# Patient Record
Sex: MALE | Race: OTHER
[De-identification: names, ages, dates, MRNs, and addresses within clinical notes are randomized per-mention and may not be internally consistent; named-entity substitution may affect disease eponyms.]

---

## 2020-07-05 ENCOUNTER — HOSPITAL ENCOUNTER (INPATIENT)
Dept: HOSPITAL 54 - ER | Age: 55
LOS: 5 days | Discharge: NURSING, REHAB OR LONG-TERM CARE | DRG: 133 | End: 2020-07-10
Attending: NURSE PRACTITIONER | Admitting: INTERNAL MEDICINE
Payer: MEDICAID

## 2020-07-05 VITALS — DIASTOLIC BLOOD PRESSURE: 80 MMHG | SYSTOLIC BLOOD PRESSURE: 134 MMHG

## 2020-07-05 VITALS — BODY MASS INDEX: 23.78 KG/M2 | WEIGHT: 148 LBS | HEIGHT: 66 IN

## 2020-07-05 VITALS — DIASTOLIC BLOOD PRESSURE: 77 MMHG | SYSTOLIC BLOOD PRESSURE: 131 MMHG

## 2020-07-05 DIAGNOSIS — G93.40: ICD-10-CM

## 2020-07-05 DIAGNOSIS — Z93.1: ICD-10-CM

## 2020-07-05 DIAGNOSIS — Z93.0: ICD-10-CM

## 2020-07-05 DIAGNOSIS — B96.5: ICD-10-CM

## 2020-07-05 DIAGNOSIS — I10: ICD-10-CM

## 2020-07-05 DIAGNOSIS — N39.0: ICD-10-CM

## 2020-07-05 DIAGNOSIS — R32: ICD-10-CM

## 2020-07-05 DIAGNOSIS — R13.10: ICD-10-CM

## 2020-07-05 DIAGNOSIS — D64.9: ICD-10-CM

## 2020-07-05 DIAGNOSIS — J96.20: Primary | ICD-10-CM

## 2020-07-05 DIAGNOSIS — E88.09: ICD-10-CM

## 2020-07-05 DIAGNOSIS — Z86.73: ICD-10-CM

## 2020-07-05 DIAGNOSIS — E44.0: ICD-10-CM

## 2020-07-05 LAB
ALBUMIN SERPL BCP-MCNC: 2.9 G/DL (ref 3.4–5)
ALP SERPL-CCNC: 117 U/L (ref 46–116)
ALT SERPL W P-5'-P-CCNC: 46 U/L (ref 12–78)
AST SERPL W P-5'-P-CCNC: 19 U/L (ref 15–37)
BASOPHILS # BLD AUTO: 0 /CMM (ref 0–0.2)
BASOPHILS NFR BLD AUTO: 0.4 % (ref 0–2)
BILIRUB SERPL-MCNC: 0.2 MG/DL (ref 0.2–1)
BUN SERPL-MCNC: 11 MG/DL (ref 7–18)
CALCIUM SERPL-MCNC: 8.9 MG/DL (ref 8.5–10.1)
CHLORIDE SERPL-SCNC: 101 MMOL/L (ref 98–107)
CK SERPL-CCNC: 18 U/L (ref 39–308)
CO2 SERPL-SCNC: 31 MMOL/L (ref 21–32)
CREAT SERPL-MCNC: 0.6 MG/DL (ref 0.6–1.3)
CRP SERPL-MCNC: 1.6 MG/DL (ref 0–0.9)
D DIMER PPP FEU-MCNC: 1.65 MG/L(FEU (ref 0.17–0.5)
EOSINOPHIL NFR BLD AUTO: 7.6 % (ref 0–6)
FERRITIN SERPL-MCNC: 122 NG/ML (ref 8–388)
GLUCOSE SERPL-MCNC: 118 MG/DL (ref 74–106)
HCT VFR BLD AUTO: 37 % (ref 39–51)
HGB BLD-MCNC: 12.1 G/DL (ref 13.5–17.5)
LYMPHOCYTES NFR BLD AUTO: 2.3 /CMM (ref 0.8–4.8)
LYMPHOCYTES NFR BLD AUTO: 22 % (ref 20–44)
MCHC RBC AUTO-ENTMCNC: 33 G/DL (ref 31–36)
MCV RBC AUTO: 91 FL (ref 80–96)
MONOCYTES NFR BLD AUTO: 0.6 /CMM (ref 0.1–1.3)
MONOCYTES NFR BLD AUTO: 5.5 % (ref 2–12)
NEUTROPHILS # BLD AUTO: 6.6 /CMM (ref 1.8–8.9)
NEUTROPHILS NFR BLD AUTO: 64.5 % (ref 43–81)
NT-PROBNP SERPL-MCNC: 207 PG/ML (ref 0–125)
PH UR STRIP: 7.5 [PH] (ref 5–8)
PLATELET # BLD AUTO: 449 /CMM (ref 150–450)
POTASSIUM SERPL-SCNC: 4.3 MMOL/L (ref 3.5–5.1)
PROT SERPL-MCNC: 7.3 G/DL (ref 6.4–8.2)
RBC # BLD AUTO: 4.03 MIL/UL (ref 4.5–6)
RBC #/AREA URNS HPF: (no result) /HPF (ref 0–2)
SODIUM SERPL-SCNC: 138 MMOL/L (ref 136–145)
UROBILINOGEN UR STRIP-MCNC: 0.2 EU/DL
WBC #/AREA URNS HPF: (no result) /HPF
WBC NRBC COR # BLD AUTO: 10.3 K/UL (ref 4.3–11)

## 2020-07-05 PROCEDURE — G0378 HOSPITAL OBSERVATION PER HR: HCPCS

## 2020-07-05 RX ADMIN — Medication PRN ML: at 17:42

## 2020-07-05 RX ADMIN — ENOXAPARIN SODIUM SCH MG: 40 INJECTION SUBCUTANEOUS at 16:56

## 2020-07-05 RX ADMIN — DEXTROSE MONOHYDRATE SCH MG: 50 INJECTION, SOLUTION INTRAVENOUS at 16:54

## 2020-07-05 RX ADMIN — ACETAMINOPHEN PRN MG: 325 TABLET ORAL at 22:44

## 2020-07-05 NOTE — NUR
RN ADMITTING NOTE 



Patient arrived around 1515, received report from Ottoniel in ER. Patient arrived on gurney, 
A/O x0, non-verbal. T-piece Portex #7 in place on 5L/min saturating 98%. Patient suctioned 
and presents with thick white/yellow secretions. Vital signs -80, HR 85, SpO2 98% T 
97.4F. Tele monitor SR 80s. Skin assessed and skin is intact. G-tube in place, clamped. 
Patient given bed bath, linens and gown changed. Bed is in lowest position, side rails x3 in 
upright position, fall safety and aspiration precautions enforced. Isolation precautions due 
to pending COVID results. Will notify MD that patient has arrived and await admitting 
orders.

## 2020-07-05 NOTE — NUR
RN NOTE 



Spoke with sub-acute RN @ [facility], they will fax over Med-Recon since it was not 
found in patient's records.

## 2020-07-05 NOTE — NUR
RN CLOSING NOTE 



Patient is resting in bed, A/O x0, non-verbal, patient is in no acute distress at this time, 
t-piece in place Portex #7 on 3L/min saturating %. Patient suctioned with thick 
yellow/white secretions. G-tube in place running Jevity 1.2 @ 30mls/hr, will endorse to 
night shift to advance as tolerated. IV line in the LFA#18g is clean and intact running TKO. 
DVT pumps are in place. All patient needs met, all due medications given, patient kept clean 
and dry throughout shift, fall safety and aspiration precautions enforced. Will endorse to 
night shift for SHELLI. 

-------------------------------------------------------------------------------

Addendum: 07/05/20 at 1846 by ANOOP FUENTES RN

-------------------------------------------------------------------------------

ISOLATION PRECAUTIONS COVID RESULTS PENDING.

## 2020-07-05 NOTE — NUR
RN OPENING NOTE



PT RECEIVED IN BED OBTUNDED. PT IS ON 3 L VIA TRACH SATING 99%. NO RESPIRATORY DISTRESS 
NOTED. PT HAS TEMP 100. COOLING MEASURES PROVIDED. SAFETY MEASURES IN PLACE. BED AT LOWEST 
POSITION, LOCKED AND SIDE RAILS UP, HOB ELEVATED, WILL CONTINUE TO MONITOR.

## 2020-07-05 NOTE — NUR
RT NOTE



PT RECEIVED ON T-PIECE @ 3LPM. SX DONE, MODERATE THICK YELLOW SECRETIONS NOTED. NO DISTRESS 
NOTED AT THIS TIME. CONT. PULSE OX CONNECTED. WILL MONITOR.

-------------------------------------------------------------------------------

Addendum: 07/05/20 at 2001 by NAKIA NELSON RT

-------------------------------------------------------------------------------

Amended: Links added.

## 2020-07-05 NOTE — NUR
pt transported to unit on gurney with emt and rn at bedside w/ acls protocol. 
nad noted during transport.

## 2020-07-06 VITALS — SYSTOLIC BLOOD PRESSURE: 151 MMHG | DIASTOLIC BLOOD PRESSURE: 89 MMHG

## 2020-07-06 VITALS — DIASTOLIC BLOOD PRESSURE: 96 MMHG | SYSTOLIC BLOOD PRESSURE: 149 MMHG

## 2020-07-06 VITALS — SYSTOLIC BLOOD PRESSURE: 145 MMHG | DIASTOLIC BLOOD PRESSURE: 85 MMHG

## 2020-07-06 VITALS — DIASTOLIC BLOOD PRESSURE: 46 MMHG | SYSTOLIC BLOOD PRESSURE: 149 MMHG

## 2020-07-06 VITALS — DIASTOLIC BLOOD PRESSURE: 71 MMHG | SYSTOLIC BLOOD PRESSURE: 136 MMHG

## 2020-07-06 VITALS — DIASTOLIC BLOOD PRESSURE: 93 MMHG | SYSTOLIC BLOOD PRESSURE: 160 MMHG

## 2020-07-06 VITALS — DIASTOLIC BLOOD PRESSURE: 98 MMHG | SYSTOLIC BLOOD PRESSURE: 147 MMHG

## 2020-07-06 VITALS — SYSTOLIC BLOOD PRESSURE: 160 MMHG | DIASTOLIC BLOOD PRESSURE: 93 MMHG

## 2020-07-06 LAB
BASOPHILS # BLD AUTO: 0 /CMM (ref 0–0.2)
BASOPHILS NFR BLD AUTO: 0.1 % (ref 0–2)
BUN SERPL-MCNC: 17 MG/DL (ref 7–18)
CALCIUM SERPL-MCNC: 9.3 MG/DL (ref 8.5–10.1)
CHLORIDE SERPL-SCNC: 100 MMOL/L (ref 98–107)
CHOLEST SERPL-MCNC: 105 MG/DL (ref ?–200)
CO2 SERPL-SCNC: 27 MMOL/L (ref 21–32)
CREAT SERPL-MCNC: 0.6 MG/DL (ref 0.6–1.3)
EOSINOPHIL NFR BLD AUTO: 0 % (ref 0–6)
GLUCOSE SERPL-MCNC: 149 MG/DL (ref 74–106)
HCT VFR BLD AUTO: 37 % (ref 39–51)
HDLC SERPL-MCNC: 31 MG/DL (ref 40–60)
HGB BLD-MCNC: 11.9 G/DL (ref 13.5–17.5)
LDLC SERPL DIRECT ASSAY-MCNC: 69 MG/DL (ref 0–99)
LYMPHOCYTES NFR BLD AUTO: 1.3 /CMM (ref 0.8–4.8)
LYMPHOCYTES NFR BLD AUTO: 11.8 % (ref 20–44)
MAGNESIUM SERPL-MCNC: 2.3 MG/DL (ref 1.8–2.4)
MCHC RBC AUTO-ENTMCNC: 32 G/DL (ref 31–36)
MCV RBC AUTO: 92 FL (ref 80–96)
MONOCYTES NFR BLD AUTO: 0.2 /CMM (ref 0.1–1.3)
MONOCYTES NFR BLD AUTO: 1.8 % (ref 2–12)
NEUTROPHILS # BLD AUTO: 9.4 /CMM (ref 1.8–8.9)
NEUTROPHILS NFR BLD AUTO: 86.3 % (ref 43–81)
PHOSPHATE SERPL-MCNC: 4.2 MG/DL (ref 2.5–4.9)
PLATELET # BLD AUTO: 437 /CMM (ref 150–450)
POTASSIUM SERPL-SCNC: 4.4 MMOL/L (ref 3.5–5.1)
RBC # BLD AUTO: 4.03 MIL/UL (ref 4.5–6)
SODIUM SERPL-SCNC: 135 MMOL/L (ref 136–145)
TRIGL SERPL-MCNC: 92 MG/DL (ref 30–150)
TSH SERPL DL<=0.005 MIU/L-ACNC: 0.61 UIU/ML (ref 0.36–3.74)
WBC NRBC COR # BLD AUTO: 10.9 K/UL (ref 4.3–11)

## 2020-07-06 RX ADMIN — DEXTROSE MONOHYDRATE SCH MLS/HR: 50 INJECTION, SOLUTION INTRAVENOUS at 13:36

## 2020-07-06 RX ADMIN — PANTOPRAZOLE SODIUM SCH MG: 40 TABLET, DELAYED RELEASE ORAL at 07:31

## 2020-07-06 RX ADMIN — SCOPOLAMINE SCH EA: 1 PATCH, EXTENDED RELEASE TRANSDERMAL at 13:35

## 2020-07-06 RX ADMIN — DEXTROSE MONOHYDRATE SCH MG: 50 INJECTION, SOLUTION INTRAVENOUS at 16:28

## 2020-07-06 RX ADMIN — METOPROLOL TARTRATE SCH MG: 50 TABLET, FILM COATED ORAL at 20:54

## 2020-07-06 RX ADMIN — DEXTROSE MONOHYDRATE SCH MG: 50 INJECTION, SOLUTION INTRAVENOUS at 08:16

## 2020-07-06 RX ADMIN — DEXTROSE MONOHYDRATE SCH MLS/HR: 50 INJECTION, SOLUTION INTRAVENOUS at 12:15

## 2020-07-06 RX ADMIN — ENOXAPARIN SODIUM SCH MG: 40 INJECTION SUBCUTANEOUS at 20:53

## 2020-07-06 NOTE — NUR
TELE RN CLOSING NOTES



PATIENT IN BED, ASLEEP ATT THIS TIME. DURING THE DAY OBTUNDED AND NO-VERBAL. TRACH PRESENT 
(#7); BREATHING IS EVEN AND UNLABORED. CARDIAC MONITOR WITH A CURRENT READING OF SR 81 BPM. 
NO S/S OF PAIN THROUGHOUT THE DAY SUCH AS MOANING, GUARDING OR FACIAL GRIMACING. LEFT 
FOREARM IV ACCESS G # 18 PRESENT AND INTACT. G-TUBE PRESENT WITH JEVITY 1.2 RUNNING AT 30 
MLS/HR.ALL NEEDS ATTENDED T DURING THE SHIFT. SAFETY PRECAUTIONS IN PLACE; BED IN LOW 
POSITION AND LOCKED, RAILS UP X2, CALL LIGHT WITHIN REACH. WILL ENDORSE TO NIGHT SHIFT 
NURSE.

## 2020-07-06 NOTE — NUR
RT NOTE



INCREASED TO 8LPM DUE TO LOW SATURATION AND HIGH HEART RATE. TEMP @ 101. RN JUANITAWMAL AT 
BEDSIDE. WILL CONTINUE TO MONITOR.

## 2020-07-06 NOTE — NUR
TELE RN OPENING NOTES



RECEIVED PATIENT IN BED, ASLEEP. TRACH PRESENT (#7); BREATHING IS EVEN AND UNLABORED. NO S/S 
OF PAIN SUCH AS MOANING, GUARDING OR FACIAL GRIMACING. LEFT FOREARM IV ACCESS G # 18 PRESENT 
AND INTACT. G-TUBE PRESENT WITH JEVITY 1.2 RUNNING AT 30 MLS/HR. SAFETY PRECAUTIONS IN 
PLACE; BED IN LOW POSITION AND LOCKED, RAILS UP X2, CALL LIGHT WITHIN REACH. WILL CONTINUE 
TO MONITOR PATIENT.

## 2020-07-06 NOTE — NUR
RN CLOSING NOTE



PATIENT IS IN BED ON TRACH NUMBER 7 CONFUSED EYE CLOSED ON 5L OXYGEN VIA CONNECTED TO TRACH 
BREATHING IS EVEN AND UNLABORED NO SOB NOTED,ALL DUE MEDS GIVEN AS MD ORDERED VIA G-TUBE 
,G-TUBE FEEDING RUNNING ON 30 CC/HR GOAL IS 70CC/HOUR.KEPT CLEAN AND DRY ALL THE 
TIME,ENDORSE NEXT COMING SHIFT FOR CONTINUATION OF CARE

## 2020-07-06 NOTE — NUR
RN OPENING NOTE



RECICED A PATIENT IN BED OBTUNDED NONVERBAL,ON TRACH #7 ON OXYGEN 7L CONNECTED TO 
TRACH,O2:98% HR 97 ON G-TUBE FEEDING JEVITY 1.2 70 CC/HR G-TUBE SITE IS INTACT,IV SITE IS ON 
LEFT FOREARM 18 G,R/O FOR COVID PENDING CONTINUE TO MONITOR

## 2020-07-06 NOTE — NUR
RN OPENING NOTE



RECEIVED PT IN BED AND APPEARS RESTING COMFORTABLY. PATIENT IS OBTUNDED, IN NO S/SX OF ACUTE 
DISTRESS AT THIS TIME. BREATHING IS EVEN AND UNLABORED. NOTED TRACHEOSTOMY TUBE WITH 
PORTEX#7 CONNECTED TO OXYGEN AT 6 LPM, SATURATING 100%; TOLERATING WELL. PATIENT ON TELE 
MONITOR READING SINUS RHYTHM, HR IS @84. NOTED IV SITE ON LEFT FOREARM G18; PATENT AND 
FLUSHING WELL,NO S/S OF INFECTION OR INFILTRATION. NOTED DVT PUMP ON BLE INTACT AND RUNNING. 
SAFETY MEASURES IMPLEMENTED PER PROTOCOL. PATIENT BED ALARM IS ON. HEAD OF BED ELEVATED. BED 
IS LOCKED,  IN LOWEST POSITION AND SIDE RAILS UP. CALL LIGHT WITHIN REACH OF THE PATIENT. 
WILL CONTINUE TO MONITOR AND REASSESS FOR ANY CHANGES.

## 2020-07-06 NOTE — NUR
RT NOTE



TITRATED FIO2 TO 5LPM. PT APPEARS TO BE COMFORTABLE. NO DISTRESS NOTED AT THIS TIME. SX 
DONE, TRACH SECURED AND PATENT. CHARGE NURSE ALEXANDRU IRENE.

## 2020-07-07 VITALS — DIASTOLIC BLOOD PRESSURE: 98 MMHG | SYSTOLIC BLOOD PRESSURE: 126 MMHG

## 2020-07-07 VITALS — SYSTOLIC BLOOD PRESSURE: 141 MMHG | DIASTOLIC BLOOD PRESSURE: 94 MMHG

## 2020-07-07 VITALS — SYSTOLIC BLOOD PRESSURE: 143 MMHG | DIASTOLIC BLOOD PRESSURE: 90 MMHG

## 2020-07-07 VITALS — DIASTOLIC BLOOD PRESSURE: 92 MMHG | SYSTOLIC BLOOD PRESSURE: 148 MMHG

## 2020-07-07 VITALS — DIASTOLIC BLOOD PRESSURE: 90 MMHG | SYSTOLIC BLOOD PRESSURE: 143 MMHG

## 2020-07-07 VITALS — SYSTOLIC BLOOD PRESSURE: 134 MMHG | DIASTOLIC BLOOD PRESSURE: 93 MMHG

## 2020-07-07 LAB
BASOPHILS # BLD AUTO: 0.1 /CMM (ref 0–0.2)
BASOPHILS NFR BLD AUTO: 0.7 % (ref 0–2)
BUN SERPL-MCNC: 20 MG/DL (ref 7–18)
CALCIUM SERPL-MCNC: 9.4 MG/DL (ref 8.5–10.1)
CHLORIDE SERPL-SCNC: 102 MMOL/L (ref 98–107)
CO2 SERPL-SCNC: 30 MMOL/L (ref 21–32)
CREAT SERPL-MCNC: 0.6 MG/DL (ref 0.6–1.3)
EOSINOPHIL NFR BLD AUTO: 0 % (ref 0–6)
GLUCOSE SERPL-MCNC: 126 MG/DL (ref 74–106)
HCT VFR BLD AUTO: 38 % (ref 39–51)
HGB BLD-MCNC: 12.1 G/DL (ref 13.5–17.5)
LYMPHOCYTES NFR BLD AUTO: 1.8 /CMM (ref 0.8–4.8)
LYMPHOCYTES NFR BLD AUTO: 12.5 % (ref 20–44)
MAGNESIUM SERPL-MCNC: 2.1 MG/DL (ref 1.8–2.4)
MCHC RBC AUTO-ENTMCNC: 32 G/DL (ref 31–36)
MCV RBC AUTO: 91 FL (ref 80–96)
MONOCYTES NFR BLD AUTO: 0.7 /CMM (ref 0.1–1.3)
MONOCYTES NFR BLD AUTO: 5.3 % (ref 2–12)
NEUTROPHILS # BLD AUTO: 11.4 /CMM (ref 1.8–8.9)
NEUTROPHILS NFR BLD AUTO: 81.5 % (ref 43–81)
PHOSPHATE SERPL-MCNC: 3.3 MG/DL (ref 2.5–4.9)
PLATELET # BLD AUTO: 385 /CMM (ref 150–450)
POTASSIUM SERPL-SCNC: 3.9 MMOL/L (ref 3.5–5.1)
RBC # BLD AUTO: 4.15 MIL/UL (ref 4.5–6)
SODIUM SERPL-SCNC: 139 MMOL/L (ref 136–145)
WBC NRBC COR # BLD AUTO: 14 K/UL (ref 4.3–11)

## 2020-07-07 RX ADMIN — DEXTROSE MONOHYDRATE SCH MG: 50 INJECTION, SOLUTION INTRAVENOUS at 17:13

## 2020-07-07 RX ADMIN — DEXTROSE MONOHYDRATE SCH MLS/HR: 50 INJECTION, SOLUTION INTRAVENOUS at 17:14

## 2020-07-07 RX ADMIN — AMLODIPINE BESYLATE SCH MG: 10 TABLET ORAL at 08:46

## 2020-07-07 RX ADMIN — CHLORHEXIDINE GLUCONATE 0.12% ORAL RINSE SCH ML: 1.2 LIQUID ORAL at 08:43

## 2020-07-07 RX ADMIN — DEXTROSE MONOHYDRATE SCH MG: 50 INJECTION, SOLUTION INTRAVENOUS at 08:46

## 2020-07-07 RX ADMIN — DEXTROSE MONOHYDRATE SCH MLS/HR: 50 INJECTION, SOLUTION INTRAVENOUS at 12:28

## 2020-07-07 RX ADMIN — METOPROLOL TARTRATE SCH MG: 50 TABLET, FILM COATED ORAL at 21:24

## 2020-07-07 RX ADMIN — PANTOPRAZOLE SODIUM SCH MG: 40 TABLET, DELAYED RELEASE ORAL at 08:44

## 2020-07-07 RX ADMIN — ACETAMINOPHEN PRN MG: 325 TABLET ORAL at 18:20

## 2020-07-07 RX ADMIN — ENOXAPARIN SODIUM SCH MG: 40 INJECTION SUBCUTANEOUS at 21:25

## 2020-07-07 RX ADMIN — POLYVINYL ALCOHOL, POVIDONE SCH ML: 14; 6 SOLUTION/ DROPS OPHTHALMIC at 08:47

## 2020-07-07 RX ADMIN — METOPROLOL TARTRATE SCH MG: 50 TABLET, FILM COATED ORAL at 08:44

## 2020-07-07 RX ADMIN — Medication SCH MG: at 08:44

## 2020-07-07 NOTE — NUR
RN CLOSING NOTE



PATIENT REMAINS IN ROOM. NO SIGNS OF RESPIRATORY DISTRESS. SAFETY MEASURES IMPLEMENTED, BED 
IN LOWEST POSITION, LOCKED, SIDE RAILS UP, CALL LIGHT WITHIN REACH. ALL NEEDS AND ORDERS 
ADDRESSED DURING THE SHIFT. ALL DUE MEDS GIVEN AS ORDERED & SCHEDULED ; PATIENT TOLERATED 
WELL.PATIENT KEPT CLEAN AND COMFORTABLE WITHIN THE SHIFT. ENDORSED TO INCOMING SHIFT RN FOR 
CONTINUITY OF CARE.

## 2020-07-07 NOTE — NUR
RN NOTE



NOTED PATIENT O2 SAT <90%, REPOSITIONED AND REASSESSED PATIENT, CHANGED O2 SENSORS. O2 SAT 
RECHECKED AND REVEALED 96% AT 6 LPM.

## 2020-07-07 NOTE — NUR
PATIENT COVID NEGATIVE PER DR. DAVIS PULMONARY NEED RESWAB. PRIMARY RN MADE AWARE.WILL 
CONTINUE ISO R/T RESWAB FOR COVID ORDER.

## 2020-07-07 NOTE — NUR
WOUND CARE CONSULT: REVIEWED CHART, NURSING DOCUMENTATION AND PHOTOS WHICH SHOW CALLUS TO RT 
LATERAL FOOT, PRESENT ON ADMISSION. RECOMMENDATIONS MADE FOR SKIN PROTECTION. DISCUSSED WITH 
NURSING STAFF. FIRST STEP LOW AIRLOSS MATTRESS ON ORDER. WILL SEE PRN. MD IN AGREEMENT WITH 
PLAN OF CARE. CURRENT ANNA SCORE IS 12.

## 2020-07-08 VITALS — SYSTOLIC BLOOD PRESSURE: 146 MMHG | DIASTOLIC BLOOD PRESSURE: 85 MMHG

## 2020-07-08 VITALS — SYSTOLIC BLOOD PRESSURE: 136 MMHG | DIASTOLIC BLOOD PRESSURE: 83 MMHG

## 2020-07-08 VITALS — SYSTOLIC BLOOD PRESSURE: 136 MMHG | DIASTOLIC BLOOD PRESSURE: 86 MMHG

## 2020-07-08 VITALS — DIASTOLIC BLOOD PRESSURE: 98 MMHG | SYSTOLIC BLOOD PRESSURE: 128 MMHG

## 2020-07-08 VITALS — SYSTOLIC BLOOD PRESSURE: 140 MMHG | DIASTOLIC BLOOD PRESSURE: 78 MMHG

## 2020-07-08 VITALS — DIASTOLIC BLOOD PRESSURE: 83 MMHG | SYSTOLIC BLOOD PRESSURE: 136 MMHG

## 2020-07-08 VITALS — DIASTOLIC BLOOD PRESSURE: 86 MMHG | SYSTOLIC BLOOD PRESSURE: 141 MMHG

## 2020-07-08 RX ADMIN — CHLORHEXIDINE GLUCONATE 0.12% ORAL RINSE SCH ML: 1.2 LIQUID ORAL at 08:27

## 2020-07-08 RX ADMIN — ACETAMINOPHEN PRN MG: 325 TABLET ORAL at 08:54

## 2020-07-08 RX ADMIN — DEXTROSE MONOHYDRATE SCH MLS/HR: 50 INJECTION, SOLUTION INTRAVENOUS at 12:33

## 2020-07-08 RX ADMIN — DEXTROSE MONOHYDRATE SCH MG: 50 INJECTION, SOLUTION INTRAVENOUS at 08:28

## 2020-07-08 RX ADMIN — CEFEPIME HYDROCHLORIDE SCH MLS/HR: 1 INJECTION, POWDER, FOR SOLUTION INTRAMUSCULAR; INTRAVENOUS at 18:20

## 2020-07-08 RX ADMIN — POLYVINYL ALCOHOL, POVIDONE SCH ML: 14; 6 SOLUTION/ DROPS OPHTHALMIC at 08:30

## 2020-07-08 RX ADMIN — PANTOPRAZOLE SODIUM SCH MG: 40 TABLET, DELAYED RELEASE ORAL at 07:49

## 2020-07-08 RX ADMIN — METOPROLOL TARTRATE SCH MG: 50 TABLET, FILM COATED ORAL at 20:53

## 2020-07-08 RX ADMIN — ENOXAPARIN SODIUM SCH MG: 40 INJECTION SUBCUTANEOUS at 20:52

## 2020-07-08 RX ADMIN — Medication SCH MG: at 08:28

## 2020-07-08 RX ADMIN — DEXTROSE MONOHYDRATE SCH MLS/HR: 50 INJECTION, SOLUTION INTRAVENOUS at 13:30

## 2020-07-08 RX ADMIN — DEXTROSE MONOHYDRATE SCH MG: 50 INJECTION, SOLUTION INTRAVENOUS at 16:20

## 2020-07-08 RX ADMIN — METOPROLOL TARTRATE SCH MG: 50 TABLET, FILM COATED ORAL at 08:27

## 2020-07-08 RX ADMIN — ACETAMINOPHEN PRN MG: 325 TABLET ORAL at 02:49

## 2020-07-08 RX ADMIN — AMLODIPINE BESYLATE SCH MG: 10 TABLET ORAL at 08:28

## 2020-07-08 NOTE — NUR
RN OPENING NOTES:



PATIENT IN BED, NONVERBAL, BUT RESPONSIVE WITH EYES OPENING SPONTANEOUSLY TO TACTILE 
STIMULI. ON T-PIECE AT 8 LITERS, TOLERATING WELL, NO RESPIRATORY DISTRESS. SPO2 >95%. ON 
CARDIAC MONITOR, SHOWING NSR, HR 60s. SAFETY PRECAUTIONS IMPLEMENTED. BED LOCKED, LOW 
POSITION, SIDE RAILS X 2 UP, HOB ELEVATED. CALL LIGHT WITHIN REACH. WILL CONT. TO MONITOR.

## 2020-07-08 NOTE — NUR
Tele/RN - Notes

GTF Jevity 1.2 rate increased to 50 ml/hr,no residual seen. Aspiration precautions 
maintained. Will continue to monitor.

## 2020-07-08 NOTE — NUR
Tele/RN - End of shift summary

No significant change in condition seen, tolerated tube feeding well at 50 ml/hr, will 
adjust to reach goal of 70 ml/hr. Patient remain afebrile, without distress, no s/s of pain, 
tele shows SR at this time. Will endorse to night RN accordingly.

## 2020-07-09 VITALS — DIASTOLIC BLOOD PRESSURE: 80 MMHG | SYSTOLIC BLOOD PRESSURE: 136 MMHG

## 2020-07-09 VITALS — DIASTOLIC BLOOD PRESSURE: 78 MMHG | SYSTOLIC BLOOD PRESSURE: 140 MMHG

## 2020-07-09 VITALS — SYSTOLIC BLOOD PRESSURE: 140 MMHG | DIASTOLIC BLOOD PRESSURE: 78 MMHG

## 2020-07-09 VITALS — DIASTOLIC BLOOD PRESSURE: 84 MMHG | SYSTOLIC BLOOD PRESSURE: 144 MMHG

## 2020-07-09 VITALS — DIASTOLIC BLOOD PRESSURE: 68 MMHG | SYSTOLIC BLOOD PRESSURE: 133 MMHG

## 2020-07-09 VITALS — SYSTOLIC BLOOD PRESSURE: 136 MMHG | DIASTOLIC BLOOD PRESSURE: 80 MMHG

## 2020-07-09 VITALS — DIASTOLIC BLOOD PRESSURE: 86 MMHG | SYSTOLIC BLOOD PRESSURE: 138 MMHG

## 2020-07-09 RX ADMIN — AMLODIPINE BESYLATE SCH MG: 10 TABLET ORAL at 09:05

## 2020-07-09 RX ADMIN — CHLORHEXIDINE GLUCONATE 0.12% ORAL RINSE SCH ML: 1.2 LIQUID ORAL at 09:06

## 2020-07-09 RX ADMIN — DEXTROSE MONOHYDRATE SCH MLS/HR: 50 INJECTION, SOLUTION INTRAVENOUS at 13:10

## 2020-07-09 RX ADMIN — ENOXAPARIN SODIUM SCH MG: 40 INJECTION SUBCUTANEOUS at 20:04

## 2020-07-09 RX ADMIN — METOPROLOL TARTRATE SCH MG: 50 TABLET, FILM COATED ORAL at 09:05

## 2020-07-09 RX ADMIN — METOPROLOL TARTRATE SCH MG: 50 TABLET, FILM COATED ORAL at 20:02

## 2020-07-09 RX ADMIN — Medication PRN ML: at 02:11

## 2020-07-09 RX ADMIN — CEFEPIME HYDROCHLORIDE SCH MLS/HR: 1 INJECTION, POWDER, FOR SOLUTION INTRAMUSCULAR; INTRAVENOUS at 05:58

## 2020-07-09 RX ADMIN — DEXTROSE MONOHYDRATE SCH MG: 50 INJECTION, SOLUTION INTRAVENOUS at 16:43

## 2020-07-09 RX ADMIN — SCOPOLAMINE SCH EA: 1 PATCH, EXTENDED RELEASE TRANSDERMAL at 14:13

## 2020-07-09 RX ADMIN — CEFEPIME HYDROCHLORIDE SCH MLS/HR: 1 INJECTION, POWDER, FOR SOLUTION INTRAMUSCULAR; INTRAVENOUS at 17:33

## 2020-07-09 RX ADMIN — DEXTROSE MONOHYDRATE SCH MG: 50 INJECTION, SOLUTION INTRAVENOUS at 09:05

## 2020-07-09 RX ADMIN — Medication SCH MG: at 09:05

## 2020-07-09 RX ADMIN — PANTOPRAZOLE SODIUM SCH MG: 40 TABLET, DELAYED RELEASE ORAL at 09:07

## 2020-07-09 RX ADMIN — POLYVINYL ALCOHOL, POVIDONE SCH ML: 14; 6 SOLUTION/ DROPS OPHTHALMIC at 08:50

## 2020-07-09 NOTE — NUR
TELE RN TRANSFER NOTES



RECEIVED A TRANSFER FROM [facility]. REPORT GIVEN BY AARON TAVERAS. PATIENT IN MEDICALLY STABLE 
CONDITION, ON VENT, SATURATING AT 99%. G-TUBE IN PLACE RUNNING JEVITY 1.2 AT 50 MLS /HR. LAC 
IV ACCESS G # 18 PRESENT AND INTACT. SAFETY PRECAUTIONS IN PLACE; BED IN LOW POSITION AND 
LOCKED, RAILS UP X2, CALL LIGHT WITHIN REACH. WILL CONTINUE TO MONITOR PATIENT.

## 2020-07-09 NOTE — NUR
RN NOTE:



PATIENT NOW NEGATIVE X2 FOR COVID. PAGED DR. DAVIS TO ASK IF PATIENT CAN BE TRANSFERRED TO 
NONCOVID UNIT. AWAITING FOR RESPONSE.

## 2020-07-09 NOTE — NUR
tele rn opening note 



received patient in bed. obtunded. on t-piece running at 6l/min with cool aerosol. portex 
#7. respirations are even and unlabored,. no s/s sob noted. continuos pulse ox connected at 
bedside. no s/s pain at this time. external tele monitor reads sinus rhythm hr 69. in no 
apparent distress. iv access in lac#18 running tko. gtube is present, residual 25ml flushed 
with no resistance, feeding Jevity running @50ml/hr. bed is low an dlocked, hob elevated in 
semi fowlers, side rials up x2, call light within reach, will continue to monitor.

## 2020-07-09 NOTE — NUR
RT NOTE



PT RECEIVED ON T-PIECE @ 8LPM. SX DONE, MODERATE THICK YELLOW SECRETIONS NOTED. NO DISTRESS 
NOTED AT THIS TIME. CONT. PULSE OX CONNECTED. WILL MONITOR.

-------------------------------------------------------------------------------

Addendum: 07/09/20 at 1942 by BRANDYN FREY RT

-------------------------------------------------------------------------------

Amended: Links added.

## 2020-07-09 NOTE — NUR
TELE RN CLOSING NOTES



PATIENT IN BED, ASLEEP AT THIS TIME. DURING THE DAY OBTUNDED AND NON-VERBAL. TRACH PRESENT 
(#7); BREATHING IS EVEN AND UNLABORED SATURATING AT 95% AT THIS TIME. CARDIAC MONITOR WITH A 
CURRENT READING OF SR 66 BPM. NO S/S OF PAIN THROUGHOUT THE DAY SUCH AS MOANING, GUARDING OR 
FACIAL GRIMACING. LAC IV ACCESS G # 18 PRESENT AND INTACT. G-TUBE PRESENT WITH JEVITY 1.2 
RUNNING AT 50 MLS/HR. ALL NEEDS ATTENDED DURING THE SHIFT. SAFETY PRECAUTIONS IN PLACE; BED 
IN LOW POSITION AND LOCKED, RAILS UP X2, CALL LIGHT WITHIN REACH. WILL ENDORSE TO NIGHT 
SHIFT NURSE.

## 2020-07-09 NOTE — NUR
RN CLOSING NOTE:



PATIENT IN BED, EYES OPEN, NONVERBAL. ON T-PIECE AT 8 LITERS. NO RESPIRATORY DISTRESS. SPO2 
>95%. NO S/S OF PAIN. PATIENT'S GTF REMAINS AT 50 MLS/HR DUE TO MODERATE RESIDUAL 20-50 MLS, 
GOAL 70 MLS/HR. ENDORSE TO ONCOMING RN.

## 2020-07-10 VITALS — SYSTOLIC BLOOD PRESSURE: 135 MMHG | DIASTOLIC BLOOD PRESSURE: 87 MMHG

## 2020-07-10 VITALS — DIASTOLIC BLOOD PRESSURE: 97 MMHG | SYSTOLIC BLOOD PRESSURE: 139 MMHG

## 2020-07-10 VITALS — DIASTOLIC BLOOD PRESSURE: 89 MMHG | SYSTOLIC BLOOD PRESSURE: 142 MMHG

## 2020-07-10 VITALS — DIASTOLIC BLOOD PRESSURE: 81 MMHG | SYSTOLIC BLOOD PRESSURE: 141 MMHG

## 2020-07-10 LAB
BASOPHILS # BLD AUTO: 0.1 /CMM (ref 0–0.2)
BASOPHILS NFR BLD AUTO: 0.5 % (ref 0–2)
BUN SERPL-MCNC: 23 MG/DL (ref 7–18)
CALCIUM SERPL-MCNC: 9 MG/DL (ref 8.5–10.1)
CHLORIDE SERPL-SCNC: 101 MMOL/L (ref 98–107)
CO2 SERPL-SCNC: 27 MMOL/L (ref 21–32)
CREAT SERPL-MCNC: 0.6 MG/DL (ref 0.6–1.3)
EOSINOPHIL NFR BLD AUTO: 0 % (ref 0–6)
GLUCOSE SERPL-MCNC: 129 MG/DL (ref 74–106)
HCT VFR BLD AUTO: 41 % (ref 39–51)
HGB BLD-MCNC: 13 G/DL (ref 13.5–17.5)
LYMPHOCYTES NFR BLD AUTO: 1.7 /CMM (ref 0.8–4.8)
LYMPHOCYTES NFR BLD AUTO: 16.5 % (ref 20–44)
MAGNESIUM SERPL-MCNC: 2.2 MG/DL (ref 1.8–2.4)
MCHC RBC AUTO-ENTMCNC: 32 G/DL (ref 31–36)
MCV RBC AUTO: 92 FL (ref 80–96)
MONOCYTES NFR BLD AUTO: 0.5 /CMM (ref 0.1–1.3)
MONOCYTES NFR BLD AUTO: 4.9 % (ref 2–12)
NEUTROPHILS # BLD AUTO: 8.2 /CMM (ref 1.8–8.9)
NEUTROPHILS NFR BLD AUTO: 78.1 % (ref 43–81)
PHOSPHATE SERPL-MCNC: 3.4 MG/DL (ref 2.5–4.9)
PLATELET # BLD AUTO: 472 /CMM (ref 150–450)
POTASSIUM SERPL-SCNC: 4 MMOL/L (ref 3.5–5.1)
RBC # BLD AUTO: 4.43 MIL/UL (ref 4.5–6)
SODIUM SERPL-SCNC: 137 MMOL/L (ref 136–145)
WBC NRBC COR # BLD AUTO: 10.5 K/UL (ref 4.3–11)

## 2020-07-10 RX ADMIN — METOPROLOL TARTRATE SCH MG: 50 TABLET, FILM COATED ORAL at 09:00

## 2020-07-10 RX ADMIN — CEFEPIME HYDROCHLORIDE SCH MLS/HR: 1 INJECTION, POWDER, FOR SOLUTION INTRAMUSCULAR; INTRAVENOUS at 17:25

## 2020-07-10 RX ADMIN — AMLODIPINE BESYLATE SCH MG: 10 TABLET ORAL at 09:10

## 2020-07-10 RX ADMIN — CEFEPIME HYDROCHLORIDE SCH MLS/HR: 1 INJECTION, POWDER, FOR SOLUTION INTRAMUSCULAR; INTRAVENOUS at 05:10

## 2020-07-10 RX ADMIN — PANTOPRAZOLE SODIUM SCH MG: 40 TABLET, DELAYED RELEASE ORAL at 06:34

## 2020-07-10 RX ADMIN — Medication SCH MG: at 09:09

## 2020-07-10 RX ADMIN — DEXTROSE MONOHYDRATE SCH MLS/HR: 50 INJECTION, SOLUTION INTRAVENOUS at 14:36

## 2020-07-10 RX ADMIN — POLYVINYL ALCOHOL, POVIDONE SCH ML: 14; 6 SOLUTION/ DROPS OPHTHALMIC at 09:00

## 2020-07-10 RX ADMIN — DEXTROSE MONOHYDRATE SCH MG: 50 INJECTION, SOLUTION INTRAVENOUS at 17:25

## 2020-07-10 RX ADMIN — CHLORHEXIDINE GLUCONATE 0.12% ORAL RINSE SCH ML: 1.2 LIQUID ORAL at 09:10

## 2020-07-10 RX ADMIN — DEXTROSE MONOHYDRATE SCH MG: 50 INJECTION, SOLUTION INTRAVENOUS at 09:09

## 2020-07-10 NOTE — NUR
tele rn opening note 



received patient in bed. obtunded. on t-piece running at 6l/min with cool aerosol. portex 
#7. respirations are even and unlabored,. no s/s sob noted. continuos pulse ox connected at 
bedside. no s/s pain at this time. external tele monitor reads sinus debby,hr 59. in no 
apparent distress. iv access in lac#18 running tko. gtube is present, residual 25ml flushed 
with no resistance, feeding Jevity running @50ml/hr. bed is low and locked, hob elevated in 
semi fowlers, side rails up x2, call light within reach, will continue to monitor.

## 2020-07-10 NOTE — NUR
WOUND CARE CONSULT: PT SEEN FOR SKIN ASSESSMENT AND NOTED TO HAVE RT FOOT CALLUS AND SACRAL 
SCAR, PRESENT ON ADMISSION. RECOMMENDATIONS MADE FOR SKIN PROTECTION. DISCUSSED WITH NURSING 
STAFF. WILL SEE PRN. MD IN AGREEMENT WITH PLAN OF CARE. 

-------------------------------------------------------------------------------

Addendum: 07/10/20 at 0805 by DEJON SCHWAB WNDNU

-------------------------------------------------------------------------------

Amended: Links added.

## 2020-07-10 NOTE — NUR
REPORT [facility]ED [facility].PT WILL CONTINUE CEFEPIME Q 12 HRS FOR 5 
MORE DAYS. GT INTACT FOR FEEDING. IV H/L TO LT AC REMAINS INTACT AND PATENT. AMBULANCE 
CALLED SAYING THAT THE  TIME WAS MOVED TO 1900. CHARGE NURSE AWARE.

## 2020-07-10 NOTE — NUR
tele rn note 



0300 checked residual 60ml, stopped feeding, 0400 check residual 45 ml, 0500 checked 
residual 10ml, flushed and begain feedin @50ml/hr. will endorse to next shift and continue 
to monitor.

## 2020-07-10 NOTE — NUR
tele rn closing note 



patient in bed. obtunded. on t-piece running at 8l/min with cool aerosol. portex #7. 
respirations are even and unlabored. no sob noted. continuos pulse ox connected at bedside. 
no s/s pain. external tele monitor reads sinus rhythm and sinus debby. no distress. iv 
access maintained in lac#18 running tko. gtube feeding Jevity running @50ml/hr. bed is low 
and locked, hob elevated in semi fowlers, side rials up x2, call light within reach, will 
endorse to next shift

## 2022-01-11 NOTE — NUR
PATIENT CALLED BACK TO SEE WHAT INFORMATION DR MORALES WAS NEEDING.    ATTEMPTED A WARM TRANSFER, UNSUCCESSFUL..    PLEASE ADVISE [phone]    PATIENT REQUESTING TO CALL TWICE IF FIRST TIME GOES STRAIGHT TO .   PT RADHA FROM [facility] C/O FEVER AND ALTERED MENTAL STATUS, PT IS 
AAOX0, NOT IN RESPIRATORY DISTRESS, HOOKED TO CARDIAC MONITOR, KEPT RESTED AND 
COMFORTABLE, WILL CONTINUE TO MONITOR.